# Patient Record
Sex: FEMALE | Race: WHITE | HISPANIC OR LATINO | Employment: FULL TIME | URBAN - METROPOLITAN AREA
[De-identification: names, ages, dates, MRNs, and addresses within clinical notes are randomized per-mention and may not be internally consistent; named-entity substitution may affect disease eponyms.]

---

## 2024-02-26 ENCOUNTER — LAB (OUTPATIENT)
Dept: LAB | Facility: LAB | Age: 39
End: 2024-02-26
Payer: COMMERCIAL

## 2024-02-26 ENCOUNTER — OFFICE VISIT (OUTPATIENT)
Dept: PRIMARY CARE | Facility: CLINIC | Age: 39
End: 2024-02-26
Payer: COMMERCIAL

## 2024-02-26 VITALS
BODY MASS INDEX: 47.09 KG/M2 | DIASTOLIC BLOOD PRESSURE: 94 MMHG | HEART RATE: 122 BPM | SYSTOLIC BLOOD PRESSURE: 142 MMHG | HEIGHT: 66 IN | OXYGEN SATURATION: 93 % | WEIGHT: 293 LBS

## 2024-02-26 DIAGNOSIS — R53.83 FATIGUE, UNSPECIFIED TYPE: Primary | ICD-10-CM

## 2024-02-26 DIAGNOSIS — G44.89 OTHER HEADACHE SYNDROME: ICD-10-CM

## 2024-02-26 DIAGNOSIS — D50.0 IRON DEFICIENCY ANEMIA DUE TO CHRONIC BLOOD LOSS: ICD-10-CM

## 2024-02-26 DIAGNOSIS — F41.1 GENERALIZED ANXIETY DISORDER: ICD-10-CM

## 2024-02-26 DIAGNOSIS — R53.83 FATIGUE, UNSPECIFIED TYPE: ICD-10-CM

## 2024-02-26 LAB
ALBUMIN SERPL BCP-MCNC: 4.3 G/DL (ref 3.4–5)
ALP SERPL-CCNC: 81 U/L (ref 33–110)
ALT SERPL W P-5'-P-CCNC: 21 U/L (ref 7–45)
ANION GAP SERPL CALC-SCNC: 11 MMOL/L (ref 10–20)
AST SERPL W P-5'-P-CCNC: 15 U/L (ref 9–39)
BILIRUB SERPL-MCNC: 0.3 MG/DL (ref 0–1.2)
BUN SERPL-MCNC: 9 MG/DL (ref 6–23)
CALCIUM SERPL-MCNC: 9.1 MG/DL (ref 8.6–10.3)
CHLORIDE SERPL-SCNC: 104 MMOL/L (ref 98–107)
CO2 SERPL-SCNC: 27 MMOL/L (ref 21–32)
CREAT SERPL-MCNC: 0.73 MG/DL (ref 0.5–1.05)
EGFRCR SERPLBLD CKD-EPI 2021: >90 ML/MIN/1.73M*2
ERYTHROCYTE [DISTWIDTH] IN BLOOD BY AUTOMATED COUNT: 14.5 % (ref 11.5–14.5)
FERRITIN SERPL-MCNC: 54 NG/ML (ref 8–150)
GLUCOSE SERPL-MCNC: 107 MG/DL (ref 74–99)
HCT VFR BLD AUTO: 40.8 % (ref 36–46)
HGB BLD-MCNC: 12.6 G/DL (ref 12–16)
IRON SATN MFR SERPL: 7 % (ref 25–45)
IRON SERPL-MCNC: 20 UG/DL (ref 35–150)
MAGNESIUM SERPL-MCNC: 1.95 MG/DL (ref 1.6–2.4)
MCH RBC QN AUTO: 25.1 PG (ref 26–34)
MCHC RBC AUTO-ENTMCNC: 30.9 G/DL (ref 32–36)
MCV RBC AUTO: 81 FL (ref 80–100)
NRBC BLD-RTO: 0 /100 WBCS (ref 0–0)
PLATELET # BLD AUTO: 289 X10*3/UL (ref 150–450)
POTASSIUM SERPL-SCNC: 4 MMOL/L (ref 3.5–5.3)
PROT SERPL-MCNC: 7 G/DL (ref 6.4–8.2)
RBC # BLD AUTO: 5.02 X10*6/UL (ref 4–5.2)
SODIUM SERPL-SCNC: 138 MMOL/L (ref 136–145)
TIBC SERPL-MCNC: 303 UG/DL (ref 240–445)
TSH SERPL-ACNC: 2.65 MIU/L (ref 0.44–3.98)
UIBC SERPL-MCNC: 283 UG/DL (ref 110–370)
VIT B12 SERPL-MCNC: 351 PG/ML (ref 211–911)
WBC # BLD AUTO: 11.3 X10*3/UL (ref 4.4–11.3)

## 2024-02-26 PROCEDURE — 82607 VITAMIN B-12: CPT

## 2024-02-26 PROCEDURE — 84443 ASSAY THYROID STIM HORMONE: CPT

## 2024-02-26 PROCEDURE — 83735 ASSAY OF MAGNESIUM: CPT

## 2024-02-26 PROCEDURE — 85027 COMPLETE CBC AUTOMATED: CPT

## 2024-02-26 PROCEDURE — 1036F TOBACCO NON-USER: CPT | Performed by: FAMILY MEDICINE

## 2024-02-26 PROCEDURE — 99214 OFFICE O/P EST MOD 30 MIN: CPT | Performed by: FAMILY MEDICINE

## 2024-02-26 PROCEDURE — 82728 ASSAY OF FERRITIN: CPT

## 2024-02-26 PROCEDURE — 80053 COMPREHEN METABOLIC PANEL: CPT

## 2024-02-26 PROCEDURE — 83550 IRON BINDING TEST: CPT

## 2024-02-26 PROCEDURE — 36415 COLL VENOUS BLD VENIPUNCTURE: CPT

## 2024-02-26 PROCEDURE — 83540 ASSAY OF IRON: CPT

## 2024-02-26 ASSESSMENT — ENCOUNTER SYMPTOMS
ABDOMINAL PAIN: 0
MYALGIAS: 0
ARTHRALGIAS: 1
PALPITATIONS: 0
LIGHT-HEADEDNESS: 0
COUGH: 1
SLEEP DISTURBANCE: 1
BLOOD IN STOOL: 0
NERVOUS/ANXIOUS: 1
DIARRHEA: 0
HEADACHES: 1
POLYDIPSIA: 0
SHORTNESS OF BREATH: 0
DIZZINESS: 1
BACK PAIN: 1
NAUSEA: 0
FREQUENCY: 0
CONSTIPATION: 0
DYSURIA: 0
FATIGUE: 1

## 2024-02-26 ASSESSMENT — PATIENT HEALTH QUESTIONNAIRE - PHQ9
SUM OF ALL RESPONSES TO PHQ9 QUESTIONS 1 AND 2: 0
2. FEELING DOWN, DEPRESSED OR HOPELESS: NOT AT ALL
1. LITTLE INTEREST OR PLEASURE IN DOING THINGS: NOT AT ALL

## 2024-02-26 NOTE — PROGRESS NOTES
"Subjective   Patient ID: Lois Jo is a 38 y.o. female who presents for Dizziness (x6mo).    Dizziness  Associated symptoms include arthralgias, coughing, fatigue and headaches. Pertinent negatives include no abdominal pain, chest pain, myalgias, nausea or rash.   She has been having some dizziness off and on for the last few months.  She is also been getting fairly regular tension-like headaches.  Excedrin helps.  But they are different from her migraine she has had before.  She also has a history of anemia from heavy periods but is on an IUD now.    Repeat check blood pressure was little bit higher than her first blood pressure.    At this point I think she has significant anxiety, but since her weight has gone up significantly last year I think that that is what is also driving the headaches and the dizziness.    Labs this week.  Likely start Lexapro and the blood pressure medication.  No office visit set yet.      Review of Systems   Constitutional:  Positive for fatigue.   HENT:  Negative for hearing loss.    Eyes:  Negative for visual disturbance.   Respiratory:  Positive for cough. Negative for shortness of breath.    Cardiovascular:  Negative for chest pain and palpitations.   Gastrointestinal:  Negative for abdominal pain, blood in stool, constipation, diarrhea and nausea.   Endocrine: Negative for cold intolerance, heat intolerance and polydipsia.   Genitourinary:  Negative for dysuria and frequency.   Musculoskeletal:  Positive for arthralgias and back pain. Negative for myalgias.   Skin:  Negative for rash.   Neurological:  Positive for dizziness and headaches. Negative for light-headedness.   Psychiatric/Behavioral:  Positive for sleep disturbance. The patient is nervous/anxious.        Objective   BP (!) 142/94   Pulse (!) 122   Ht 1.676 m (5' 6\")   Wt 143 kg (316 lb)   SpO2 93%   BMI 51.00 kg/m²     Physical Exam  Constitutional:       Appearance: Normal appearance.   HENT:      Head: " Normocephalic and atraumatic.      Right Ear: Tympanic membrane and ear canal normal.      Left Ear: Tympanic membrane and ear canal normal.      Mouth/Throat:      Mouth: Mucous membranes are moist.      Pharynx: No oropharyngeal exudate or posterior oropharyngeal erythema.   Cardiovascular:      Rate and Rhythm: Normal rate and regular rhythm.      Heart sounds: Normal heart sounds.   Pulmonary:      Effort: Pulmonary effort is normal.      Breath sounds: Normal breath sounds.   Skin:     General: Skin is warm and dry.   Neurological:      General: No focal deficit present.      Mental Status: She is alert and oriented to person, place, and time.   Psychiatric:         Mood and Affect: Mood is anxious.         Behavior: Behavior normal.         Thought Content: Thought content normal.         Judgment: Judgment normal.         Assessment/Plan   Problem List Items Addressed This Visit             ICD-10-CM    Other headache syndrome G44.89    Relevant Orders    CBC    Comprehensive Metabolic Panel    Magnesium    Vitamin B12    Thyroid Stimulating Hormone    Iron and TIBC    Ferritin    Iron deficiency anemia due to chronic blood loss D50.0    Relevant Orders    CBC    Comprehensive Metabolic Panel    Magnesium    Vitamin B12    Thyroid Stimulating Hormone    Iron and TIBC    Ferritin    Generalized anxiety disorder F41.1     Other Visit Diagnoses         Codes    Fatigue, unspecified type    -  Primary R53.83    Relevant Orders    CBC    Comprehensive Metabolic Panel    Magnesium    Vitamin B12    Thyroid Stimulating Hormone    Iron and TIBC    Ferritin

## 2024-02-27 ENCOUNTER — TELEPHONE (OUTPATIENT)
Dept: PRIMARY CARE | Facility: CLINIC | Age: 39
End: 2024-02-27
Payer: COMMERCIAL

## 2024-02-27 DIAGNOSIS — Z00.00 ROUTINE GENERAL MEDICAL EXAMINATION AT A HEALTH CARE FACILITY: Primary | ICD-10-CM

## 2024-02-27 RX ORDER — ESCITALOPRAM OXALATE 5 MG/1
5 TABLET ORAL DAILY
Qty: 30 TABLET | Refills: 1 | Status: SHIPPED | OUTPATIENT
Start: 2024-02-27 | End: 2024-04-05 | Stop reason: ALTCHOICE

## 2024-02-27 RX ORDER — AMLODIPINE BESYLATE 5 MG/1
5 TABLET ORAL DAILY
Qty: 30 TABLET | Refills: 1 | Status: SHIPPED | OUTPATIENT
Start: 2024-02-27 | End: 2024-04-05 | Stop reason: SDUPTHER

## 2024-02-27 NOTE — TELEPHONE ENCOUNTER
----- Message from Jayjay Fletcher MD sent at 2/27/2024 11:53 AM EST -----  Notify Basic labs are okay  New medicines done for mood and blood pressure  Needs an office visit in 6 weeks.

## 2024-02-27 NOTE — RESULT ENCOUNTER NOTE
Notify Basic labs are okay  New medicines done for mood and blood pressure  Needs an office visit in 6 weeks.

## 2024-04-05 ENCOUNTER — OFFICE VISIT (OUTPATIENT)
Dept: PRIMARY CARE | Facility: CLINIC | Age: 39
End: 2024-04-05
Payer: COMMERCIAL

## 2024-04-05 VITALS
WEIGHT: 293 LBS | HEART RATE: 127 BPM | HEIGHT: 66 IN | DIASTOLIC BLOOD PRESSURE: 84 MMHG | SYSTOLIC BLOOD PRESSURE: 118 MMHG | BODY MASS INDEX: 47.09 KG/M2 | OXYGEN SATURATION: 96 %

## 2024-04-05 DIAGNOSIS — F41.1 GENERALIZED ANXIETY DISORDER: ICD-10-CM

## 2024-04-05 DIAGNOSIS — I10 PRIMARY HYPERTENSION: Primary | ICD-10-CM

## 2024-04-05 DIAGNOSIS — R05.1 ACUTE COUGH: ICD-10-CM

## 2024-04-05 PROCEDURE — 99214 OFFICE O/P EST MOD 30 MIN: CPT | Performed by: FAMILY MEDICINE

## 2024-04-05 PROCEDURE — 3079F DIAST BP 80-89 MM HG: CPT | Performed by: FAMILY MEDICINE

## 2024-04-05 PROCEDURE — 3074F SYST BP LT 130 MM HG: CPT | Performed by: FAMILY MEDICINE

## 2024-04-05 PROCEDURE — 1036F TOBACCO NON-USER: CPT | Performed by: FAMILY MEDICINE

## 2024-04-05 RX ORDER — FLUOXETINE 10 MG/1
10 CAPSULE ORAL DAILY
Qty: 90 CAPSULE | Refills: 3 | Status: SHIPPED | OUTPATIENT
Start: 2024-04-05 | End: 2025-04-05

## 2024-04-05 RX ORDER — AMLODIPINE BESYLATE 5 MG/1
5 TABLET ORAL DAILY
Qty: 90 TABLET | Refills: 3 | Status: SHIPPED | OUTPATIENT
Start: 2024-04-05 | End: 2025-04-05

## 2024-04-05 ASSESSMENT — PATIENT HEALTH QUESTIONNAIRE - PHQ9
5. POOR APPETITE OR OVEREATING: NEARLY EVERY DAY
3. TROUBLE FALLING OR STAYING ASLEEP OR SLEEPING TOO MUCH: NEARLY EVERY DAY
SUM OF ALL RESPONSES TO PHQ9 QUESTIONS 1 AND 2: 6
9. THOUGHTS THAT YOU WOULD BE BETTER OFF DEAD, OR OF HURTING YOURSELF: NOT AT ALL
8. MOVING OR SPEAKING SO SLOWLY THAT OTHER PEOPLE COULD HAVE NOTICED. OR THE OPPOSITE, BEING SO FIGETY OR RESTLESS THAT YOU HAVE BEEN MOVING AROUND A LOT MORE THAN USUAL: NOT AT ALL
4. FEELING TIRED OR HAVING LITTLE ENERGY: NEARLY EVERY DAY
2. FEELING DOWN, DEPRESSED OR HOPELESS: NEARLY EVERY DAY
10. IF YOU CHECKED OFF ANY PROBLEMS, HOW DIFFICULT HAVE THESE PROBLEMS MADE IT FOR YOU TO DO YOUR WORK, TAKE CARE OF THINGS AT HOME, OR GET ALONG WITH OTHER PEOPLE: SOMEWHAT DIFFICULT
7. TROUBLE CONCENTRATING ON THINGS, SUCH AS READING THE NEWSPAPER OR WATCHING TELEVISION: NOT AT ALL
SUM OF ALL RESPONSES TO PHQ QUESTIONS 1-9: 18
6. FEELING BAD ABOUT YOURSELF - OR THAT YOU ARE A FAILURE OR HAVE LET YOURSELF OR YOUR FAMILY DOWN: NEARLY EVERY DAY
1. LITTLE INTEREST OR PLEASURE IN DOING THINGS: NEARLY EVERY DAY

## 2024-04-05 ASSESSMENT — ENCOUNTER SYMPTOMS
PALPITATIONS: 0
FATIGUE: 0
DIARRHEA: 0
NAUSEA: 0
SHORTNESS OF BREATH: 0
COUGH: 1
HEADACHES: 0

## 2024-04-05 NOTE — PROGRESS NOTES
"Subjective   Patient ID: Lois Jo is a 38 y.o. female who presents for Follow-up (Having mood swings with Lexapro).    HPI   The Lexapro helped with her anxiety, but the longer she stayed on the medicine that actually started to cause a little bit of depression down mood and zombielike feeling.  She has been using it every other day to every third day and it is controlling the anxiety but still too much of the down feeling on the medicine.    The dizziness is completely gone on the blood pressure medicine and her blood pressure is much better than before.    Continue amlodipine 5 mg daily  Stop Lexapro  Start fluoxetine 10 mg daily  Office visit 6 months    Sick with a little bit of cough for the last week.  She is slowly getting better with it.  Exam is okay pulse ox is okay.  No additional medications at this time as needed Tylenol or ibuprofen.    Review of Systems   Constitutional:  Negative for fatigue.   HENT:  Positive for congestion.    Respiratory:  Positive for cough. Negative for shortness of breath.    Cardiovascular:  Negative for chest pain and palpitations.   Gastrointestinal:  Negative for diarrhea and nausea.   Neurological:  Negative for headaches.       Objective   /84   Pulse (!) 127   Ht 1.676 m (5' 6\")   Wt 142 kg (313 lb)   SpO2 96%   BMI 50.52 kg/m²     Physical Exam  Constitutional:       Appearance: Normal appearance.   HENT:      Head: Normocephalic and atraumatic.   Cardiovascular:      Rate and Rhythm: Normal rate and regular rhythm.      Heart sounds: Normal heart sounds.   Pulmonary:      Effort: Pulmonary effort is normal.      Breath sounds: Normal breath sounds.   Skin:     General: Skin is warm and dry.   Neurological:      General: No focal deficit present.      Mental Status: She is alert and oriented to person, place, and time.   Psychiatric:         Mood and Affect: Mood normal.         Behavior: Behavior normal.         Thought Content: Thought content normal.    "      Judgment: Judgment normal.         Assessment/Plan   Problem List Items Addressed This Visit             ICD-10-CM    Generalized anxiety disorder F41.1    Relevant Medications    FLUoxetine (PROzac) 10 mg capsule    Primary hypertension - Primary I10    Relevant Medications    amLODIPine (Norvasc) 5 mg tablet     Other Visit Diagnoses         Codes    Acute cough     R05.1

## 2024-06-04 ENCOUNTER — OFFICE VISIT (OUTPATIENT)
Dept: PRIMARY CARE | Facility: CLINIC | Age: 39
End: 2024-06-04
Payer: COMMERCIAL

## 2024-06-04 VITALS
HEIGHT: 66 IN | OXYGEN SATURATION: 98 % | SYSTOLIC BLOOD PRESSURE: 122 MMHG | BODY MASS INDEX: 47.09 KG/M2 | HEART RATE: 94 BPM | WEIGHT: 293 LBS | DIASTOLIC BLOOD PRESSURE: 84 MMHG

## 2024-06-04 DIAGNOSIS — K42.9 UMBILICAL HERNIA WITHOUT OBSTRUCTION AND WITHOUT GANGRENE: ICD-10-CM

## 2024-06-04 DIAGNOSIS — R05.1 ACUTE COUGH: ICD-10-CM

## 2024-06-04 DIAGNOSIS — R09.82 POSTNASAL DRIP: ICD-10-CM

## 2024-06-04 DIAGNOSIS — R35.0 URINARY FREQUENCY: Primary | ICD-10-CM

## 2024-06-04 DIAGNOSIS — J06.9 UPPER RESPIRATORY TRACT INFECTION, UNSPECIFIED TYPE: ICD-10-CM

## 2024-06-04 LAB
POC APPEARANCE, URINE: CLEAR
POC BILIRUBIN, URINE: ABNORMAL
POC BLOOD, URINE: ABNORMAL
POC COLOR, URINE: YELLOW
POC GLUCOSE, URINE: NEGATIVE MG/DL
POC KETONES, URINE: NEGATIVE MG/DL
POC LEUKOCYTES, URINE: ABNORMAL
POC NITRITE,URINE: NEGATIVE
POC PH, URINE: 5.5 PH
POC PROTEIN, URINE: ABNORMAL MG/DL
POC SPECIFIC GRAVITY, URINE: 1.02
POC UROBILINOGEN, URINE: 0.2 EU/DL

## 2024-06-04 PROCEDURE — 99214 OFFICE O/P EST MOD 30 MIN: CPT | Performed by: STUDENT IN AN ORGANIZED HEALTH CARE EDUCATION/TRAINING PROGRAM

## 2024-06-04 PROCEDURE — 3079F DIAST BP 80-89 MM HG: CPT | Performed by: STUDENT IN AN ORGANIZED HEALTH CARE EDUCATION/TRAINING PROGRAM

## 2024-06-04 PROCEDURE — 1036F TOBACCO NON-USER: CPT | Performed by: STUDENT IN AN ORGANIZED HEALTH CARE EDUCATION/TRAINING PROGRAM

## 2024-06-04 PROCEDURE — 81003 URINALYSIS AUTO W/O SCOPE: CPT | Performed by: STUDENT IN AN ORGANIZED HEALTH CARE EDUCATION/TRAINING PROGRAM

## 2024-06-04 PROCEDURE — 3074F SYST BP LT 130 MM HG: CPT | Performed by: STUDENT IN AN ORGANIZED HEALTH CARE EDUCATION/TRAINING PROGRAM

## 2024-06-04 RX ORDER — AMOXICILLIN 875 MG/1
875 TABLET, FILM COATED ORAL 2 TIMES DAILY
Qty: 14 TABLET | Refills: 0 | Status: SHIPPED | OUTPATIENT
Start: 2024-06-04 | End: 2024-06-11 | Stop reason: ALTCHOICE

## 2024-06-04 RX ORDER — FLUTICASONE PROPIONATE 50 MCG
1 SPRAY, SUSPENSION (ML) NASAL DAILY
Qty: 16 G | Refills: 2 | Status: SHIPPED | OUTPATIENT
Start: 2024-06-04 | End: 2024-06-11 | Stop reason: ALTCHOICE

## 2024-06-04 RX ORDER — BENZONATATE 100 MG/1
100 CAPSULE ORAL 3 TIMES DAILY PRN
Qty: 42 CAPSULE | Refills: 0 | Status: SHIPPED | OUTPATIENT
Start: 2024-06-04 | End: 2024-06-11 | Stop reason: ALTCHOICE

## 2024-06-04 ASSESSMENT — PATIENT HEALTH QUESTIONNAIRE - PHQ9
SUM OF ALL RESPONSES TO PHQ9 QUESTIONS 1 & 2: 0
2. FEELING DOWN, DEPRESSED OR HOPELESS: NOT AT ALL
1. LITTLE INTEREST OR PLEASURE IN DOING THINGS: NOT AT ALL

## 2024-06-04 NOTE — PROGRESS NOTES
Subjective:  Lois Jo is a 38 y.o. female who presents to clinic today for UTI SYMPTOMS  (FREQUENCY AND BURNING, FEELING LIKE NEED TO GO BUT JUST WENT, STARTED SUNDAY) and URI (X 10 DAYS COUGH, DRAINAGE)      She notes that she has had symptoms of URI since May 21. She had ear pain and coughing, runny nose. This past weekend so 2-4 days ago she has had a significant increase in coughing and sputum production. She is coughing so hard she is seeing stars. She is also gagging.  She initially had a sore throat which has fully cleared up.  2 days ago she felt that she needed to urinate and still felt like she needed to after going. No burning or frequency otherwise. She does have an IUD in place and is having her first period in 6 months. She feels like she can't empty her bladder fully.     She is not fevers, chills, vomiting. She had 1 day of diarrhea.     Review of Systems    Assessment/Plan:  Lois Jo is a 38 y.o. female with a history of obesity, umbilical hernia, anxiety who presents to clinic today to address the following issues:   1. Urinary frequency  POCT UA Automated manually resulted    amoxicillin (Amoxil) 875 mg tablet      2. Upper respiratory tract infection, unspecified type  amoxicillin (Amoxil) 875 mg tablet    fluticasone (Flonase) 50 mcg/actuation nasal spray      3. Postnasal drip  fluticasone (Flonase) 50 mcg/actuation nasal spray      4. Acute cough  benzonatate (Tessalon) 100 mg capsule      5. Umbilical hernia without obstruction and without gangrene  Referral to General Surgery        Discussed with Lois that her symptoms are likely related to post nasal drip but with the length of symptoms and possible early UTI it makes sense to start her on amoxicillin 875mg BID for 7 days. We discussed the risks/benefits and alternatives and also discussed supportive care. She will additionally use flonase, tessalon pearles (counseled on danger to children) and mucinex as needed.    She  "was also referred to general surgery to evaluate and possibly repair her large umbilical hernia.    Problem List Items Addressed This Visit    None  Visit Diagnoses       Urinary frequency    -  Primary    Relevant Medications    amoxicillin (Amoxil) 875 mg tablet    Other Relevant Orders    POCT UA Automated manually resulted (Completed)    Upper respiratory tract infection, unspecified type        Relevant Medications    amoxicillin (Amoxil) 875 mg tablet    fluticasone (Flonase) 50 mcg/actuation nasal spray    Postnasal drip        Relevant Medications    fluticasone (Flonase) 50 mcg/actuation nasal spray    Acute cough        Relevant Medications    benzonatate (Tessalon) 100 mg capsule    Umbilical hernia without obstruction and without gangrene        Relevant Orders    Referral to General Surgery            Patient Instructions   Sinuses:    Every day twice daily    1) 2 puffs Fluticasone in each nostril    After two weeks, reduce that Flonase to 2 puffs once daily     You can try mucinex    Amoxicillin 875mg twice daily  Honey as needed, best as a spoonful    Follow up: as needed    Return precautions discussed.  An After Visit Summary was given to the patient.  All questions were answered and patient in agreement with plan.    Objective:  /84   Pulse 94   Ht 1.676 m (5' 6\")   Wt 138 kg (304 lb 6.4 oz)   SpO2 98%   BMI 49.13 kg/m²     Physical Exam  Vitals and nursing note reviewed.   Constitutional:       General: She is not in acute distress.     Appearance: Normal appearance.   HENT:      Head: Normocephalic and atraumatic.      Right Ear: Tympanic membrane and ear canal normal.      Left Ear: Tympanic membrane and ear canal normal.      Nose: Congestion present.      Mouth/Throat:      Mouth: Mucous membranes are moist.      Pharynx: Posterior oropharyngeal erythema present.   Eyes:      General: No scleral icterus.        Right eye: No discharge.         Left eye: No discharge.      " Extraocular Movements: Extraocular movements intact.      Conjunctiva/sclera: Conjunctivae normal.   Cardiovascular:      Rate and Rhythm: Normal rate and regular rhythm.   Pulmonary:      Effort: Pulmonary effort is normal. No respiratory distress.      Breath sounds: Normal breath sounds.   Abdominal:      Tenderness: There is no right CVA tenderness, left CVA tenderness, guarding or rebound.      Comments: Large umbilical hernia   Skin:     General: Skin is warm and dry.   Neurological:      General: No focal deficit present.      Mental Status: She is alert and oriented to person, place, and time.   Psychiatric:         Attention and Perception: Attention normal.         Mood and Affect: Mood normal.         Speech: Speech normal.         Behavior: Behavior normal.         Cognition and Memory: Cognition and memory normal.         Judgment: Judgment normal.         I spent 18 minutes in total time for this visit including all related clinical activities before, during, and after the visit excluding other billable activities/procedure time.     Kiah Street MD

## 2024-06-04 NOTE — PATIENT INSTRUCTIONS
Sinuses:    Every day twice daily    1) 2 puffs Fluticasone in each nostril    After two weeks, reduce that Flonase to 2 puffs once daily     You can try mucinex    Amoxicillin 875mg twice daily  Honey as needed, best as a spoonful

## 2024-06-11 ENCOUNTER — OFFICE VISIT (OUTPATIENT)
Dept: SURGERY | Facility: CLINIC | Age: 39
End: 2024-06-11
Payer: COMMERCIAL

## 2024-06-11 VITALS
HEART RATE: 104 BPM | HEIGHT: 66 IN | DIASTOLIC BLOOD PRESSURE: 90 MMHG | WEIGHT: 293 LBS | BODY MASS INDEX: 47.09 KG/M2 | SYSTOLIC BLOOD PRESSURE: 142 MMHG

## 2024-06-11 DIAGNOSIS — K42.9 UMBILICAL HERNIA WITHOUT OBSTRUCTION AND WITHOUT GANGRENE: ICD-10-CM

## 2024-06-11 PROCEDURE — 3077F SYST BP >= 140 MM HG: CPT | Performed by: SURGERY

## 2024-06-11 PROCEDURE — 3080F DIAST BP >= 90 MM HG: CPT | Performed by: SURGERY

## 2024-06-11 PROCEDURE — 1036F TOBACCO NON-USER: CPT | Performed by: SURGERY

## 2024-06-11 PROCEDURE — 99213 OFFICE O/P EST LOW 20 MIN: CPT | Performed by: SURGERY

## 2024-06-11 NOTE — PROGRESS NOTES
General Surgery Consultation    Patient: Lois Jo  : 1985  MRN: [id number]  Date of Consultation: 24    Primary Care Provider: Jayjay Fletcher MD  Referring Provider: Kiah Street MD    Chief Complaint: Enlarging umbilical hernia    History of Present Illness: Lois Jo is a 38 y.o. old female seen at the request of Kiah Street MD for evaluation of an enlarging umbilical hernia.  The patient states that has been there since 2019.  It is never been incarcerated.  She is always able to reduce it.  She has gained weight since 2019 and her BMI is currently at 49.  She did share she has recently joined [employer] and has lost over 10 pounds in the last 6 weeks or so.  She is quite encouraged and likes the program..     Medical History:  Past Medical History:   Diagnosis Date    Anxiety     Encounter for full-term uncomplicated delivery ([facility])     Normal vaginal delivery    Headache     Hypertension     Iron deficiency anemia     Obesity, unspecified     Obesity with body mass index of 30.0-39.9    Other conditions influencing health status     Menstruation    Other specified postprocedural states     History of Papanicolaou smear    Presence of (intrauterine) contraceptive device     IUD (intrauterine device) in place       Surgical History:  Past Surgical History:   Procedure Laterality Date    OTHER SURGICAL HISTORY  2020    [place] tooth extraction    OTHER SURGICAL HISTORY  2014    Sigmoidoscopy flexible/ [place]       Home Medications:  Prior to Admission medications    Medication Sig Start Date End Date Taking? Authorizing Provider   amLODIPine (Norvasc) 5 mg tablet Take 1 tablet (5 mg) by mouth once daily. 24 Yes Jayjay Fletcher MD   FLUoxetine (PROzac) 10 mg capsule Take 1 capsule (10 mg) by mouth once daily. 24 Yes Jayjay Fletcher MD   amoxicillin (Amoxil) 875 mg tablet Take 1 tablet (875 mg) by mouth 2 times a day for 7 days. 24   "Kiah Street MD   benzonatate (Tessalon) 100 mg capsule Take 1 capsule (100 mg) by mouth 3 times a day as needed for cough. Do not crush or chew. 6/4/24 7/4/24  Kiah Street MD   fluticasone (Flonase) 50 mcg/actuation nasal spray Administer 1 spray into each nostril once daily. Shake gently. Before first use, prime pump. After use, clean tip and replace cap. 6/4/24 6/4/25  Kiah Street MD       Allergies:  No Known Allergies    Family History:   Family History   Problem Relation Name Age of Onset    Depression Mother      Irritable bowel syndrome Mother      Hypertension Mother      Alcohol abuse Father      Diabetes Father      Hypertension Father      Testicular cancer Brother      Hypertension Maternal Grandfather      Heart disease Maternal Grandfather      Cancer Other          GRANDMOTHER    Atrial fibrillation Other          GRANDFATHER       Social History:  She does not smoke she drinks socially and does not use drug      ROS:  Constitutional:  no fever, sweats, and chills  Cardiovascular: No chest pain  Respiratory: No cough or shortness of breath  Gastrointestinal: Denies  Genitourinary: no dysuria  Musculoskeletal: no weakness or swelling  Integumentary: no rashes  Neurological: no confusion  Endocrine: no heat or cold intolerance  Heme/Lymph: no easy bruising or bleeding    Objective:  /90   Pulse 104   Ht 1.676 m (5' 6\")   Wt 140 kg (309 lb 9.6 oz)   BMI 49.97 kg/m²     Physical Exam:  Constitutional: No acute distress, conversant, pleasant  Neurologic: alert and oriented  Psych: appropriate affect  Ears, Nose, Mouth and Throat: mucus membranes moist  Pulmonary: No labored breathing  Cardiovascular: Regular rate and rhythm  Abdomen: soft, non-distended, non-tender abdomen is globus and obese and she has a 3 cm fascial defect with reducible hernia above this.  It is nontender.  It is soft.  Musculoskeletal: Moves all extremities, no edema  Skin: warm and dry    Assessment and Plan: " Lois Jo is a 38 y.o. old female with an enlarging umbilical hernia.  We discussed the normal recommendations with hernia repair such as non-smoking and doing it in a nontension fashion.  I have recommended that she continue to work on weight loss.  We did discuss 50 if it acutely gets incarcerated and we cannot reduce it that we could fix this emergently but there is a higher risk of recurrence.  We will see her back in 2 months to reassess as she is strongly motivated to lose the weight and I imagine we will be able to fix this in short order.  She is quite pleased..     Rajni Ferro MD  6/11/2024

## 2024-08-13 ENCOUNTER — APPOINTMENT (OUTPATIENT)
Dept: SURGERY | Facility: CLINIC | Age: 39
End: 2024-08-13
Payer: COMMERCIAL

## 2024-09-03 ENCOUNTER — HOSPITAL ENCOUNTER (OUTPATIENT)
Dept: RADIOLOGY | Facility: HOSPITAL | Age: 39
Discharge: HOME | End: 2024-09-03
Payer: COMMERCIAL

## 2024-09-03 ENCOUNTER — OFFICE VISIT (OUTPATIENT)
Dept: URGENT CARE | Facility: CLINIC | Age: 39
End: 2024-09-03
Payer: COMMERCIAL

## 2024-09-03 VITALS
RESPIRATION RATE: 16 BRPM | BODY MASS INDEX: 47.09 KG/M2 | SYSTOLIC BLOOD PRESSURE: 134 MMHG | WEIGHT: 293 LBS | DIASTOLIC BLOOD PRESSURE: 91 MMHG | OXYGEN SATURATION: 98 % | TEMPERATURE: 97.5 F | HEART RATE: 100 BPM | HEIGHT: 66 IN

## 2024-09-03 DIAGNOSIS — M25.532 WRIST PAIN, ACUTE, LEFT: ICD-10-CM

## 2024-09-03 DIAGNOSIS — M25.572 ACUTE LEFT ANKLE PAIN: ICD-10-CM

## 2024-09-03 DIAGNOSIS — M25.532 WRIST PAIN, ACUTE, LEFT: Primary | ICD-10-CM

## 2024-09-03 PROCEDURE — 73110 X-RAY EXAM OF WRIST: CPT | Mod: LEFT SIDE | Performed by: RADIOLOGY

## 2024-09-03 PROCEDURE — 73610 X-RAY EXAM OF ANKLE: CPT | Mod: LT

## 2024-09-03 PROCEDURE — 73610 X-RAY EXAM OF ANKLE: CPT | Mod: LEFT SIDE | Performed by: RADIOLOGY

## 2024-09-03 PROCEDURE — 73110 X-RAY EXAM OF WRIST: CPT | Mod: LT

## 2024-09-03 PROCEDURE — 99212 OFFICE O/P EST SF 10 MIN: CPT | Performed by: PHYSICIAN ASSISTANT

## 2024-09-03 NOTE — PROGRESS NOTES
[facility] URGENT CARE [facility] NOTE:      Name: Lois Jo, 39 y.o.    CSN:[id number]   MRN:[id number]    PCP: TASNEEM Marroquin-CNP    ALL:  No Known Allergies    History:    Chief Complaint: Ankle Pain (Left ankle pain and left wrist pain x 3 days/Right knee looked at to make sure its heeling )    Encounter Date: 9/3/2024  10:10AM    HPI: The history was obtained from the patient. Lois is a 39 y.o. female, who presents with a chief complaint of Ankle Pain (Left ankle pain and left wrist pain x 3 days/Right knee looked at to make sure its heeling ) Patient states she she tripped on an electric cord cover at the country [facility], in which her ankle became twisted and she fell on her left hand and right knee.     Patient reports no head injury, LOC, or injury to other areas. Patient currently cannot bear weight on the left ankle and needs assistance when walking. Patient has a history of multiple injuries to the left ankle and a reported fracture (site unknown).     PMHx:    Past Medical History:   Diagnosis Date    Anxiety     Encounter for full-term uncomplicated delivery ([facility])     Normal vaginal delivery    Headache     Hypertension     Iron deficiency anemia     Obesity, unspecified     Obesity with body mass index of 30.0-39.9    Other conditions influencing health status     Menstruation    Other specified postprocedural states     History of Papanicolaou smear    Presence of (intrauterine) contraceptive device     IUD (intrauterine device) in place              Current Outpatient Medications   Medication Sig Dispense Refill    amLODIPine (Norvasc) 5 mg tablet Take 1 tablet (5 mg) by mouth once daily. 90 tablet 3    FLUoxetine (PROzac) 10 mg capsule Take 1 capsule (10 mg) by mouth once daily. 90 capsule 3     No current facility-administered medications for this visit.         PMSx:    Past Surgical History:   Procedure Laterality Date    OTHER SURGICAL HISTORY  09/24/2020    [place] tooth extraction     OTHER SURGICAL HISTORY  2014    Sigmoidoscopy flexible/ [facility] Hx:   Family History   Problem Relation Name Age of Onset    Depression Mother      Irritable bowel syndrome Mother      Hypertension Mother      Alcohol abuse Father      Diabetes Father      Hypertension Father      Testicular cancer Brother      Hypertension Maternal Grandfather      Heart disease Maternal Grandfather      Cancer Other          GRANDMOTHER    Atrial fibrillation Other          GRANDFATHER       SOC. Hx:     Social History     Socioeconomic History    Marital status:      Spouse name: Charles    Number of children: Not on file    Years of education: Not on file    Highest education level: Not on file   Occupational History    Not on file   Tobacco Use    Smoking status: Never     Passive exposure: Never    Smokeless tobacco: Never   Vaping Use    Vaping status: Never Used   Substance and Sexual Activity    Alcohol use: Yes    Drug use: Never    Sexual activity: Defer   Other Topics Concern    Not on file   Social History Narrative    Not on file     Social Determinants of Health     Financial Resource Strain: Not on file   Food Insecurity: Not on file   Transportation Needs: Not on file   Physical Activity: Not on file   Stress: Not on file   Social Connections: Not on file   Intimate Partner Violence: Not on file   Housing Stability: Not on file         Vitals:    09/03/24 0949   BP: (!) 134/91   Pulse: 100   Resp: 16   Temp: 36.4 °C (97.5 °F)   SpO2: 98%     140 kg (308 lb)          Physical Exam  Vitals reviewed.   Constitutional:       Appearance: Normal appearance. She is obese.      Comments: Female accompanied by another female visitor she is sitting in wheelchair.   HENT:      Head: Normocephalic and atraumatic.      Mouth/Throat:      Mouth: Mucous membranes are moist.   Cardiovascular:      Rate and Rhythm: Normal rate.   Pulmonary:      Effort: Pulmonary effort is normal.   Musculoskeletal:          General: Swelling present.      Left wrist: Swelling and snuff box tenderness present. Decreased range of motion.      Cervical back: Normal range of motion.      Right ankle: No swelling. No tenderness. Normal range of motion.      Left ankle: Swelling present. Tenderness present over the ATF ligament. Decreased range of motion.   Neurological:      General: No focal deficit present.      Mental Status: She is alert.             LABORATORY @ RADIOLOGICAL IMAGING (if done):   Both reviewed  XR Wrist:      FINDINGS:  No acute fracture or dislocation. Diffuse soft tissue swelling.  Chronic avulsion fracture of the medial malleolus tip. Plantar  calcaneal enthesophyte.      IMPRESSION:  Soft tissue swelling without acute osseous abnormality.      Signed by: Tiffany Jones 9/3/2024 12:36 PM  Dictation workstation:   [id number]    XR ANKLE  FINDINGS:  No acute fracture or dislocation. No significant soft tissue  swelling. Minus ulnar variance.      IMPRESSION:  No acute osseous abnormality.    ____________________________________________________________________    I did personally review Lois's past medical history, surgical history, social history, as well as family history (when relevant).  In this case, I also oversaw the her drug management by reviewing her medication list, allergy list, as well as the medications that I prescribed during the UC course and/or recommended as an out-patient (including possible OTC medications such as acetaminophen, NSAIDs , etc).    After reviewing the items above, I did look at previous medical documentation, such as recent hospitalizations, office visits, and/or recent consultations with PCP/specialist.                          SDOH:   Another factor that I considered in Lois's care was her Social Determinants of Health (SDOH). During this UC encounter, she did not have social determinants of health. Those SDOH influencing Lois's care are: none      UC COURSE/MEDICAL DECISION  MAKING:    Lois is a 39 y.o., who presents with a working diagnosis of   1. Wrist pain, acute, left    2. Acute left ankle pain     with a differential to include:   Sprain, strain, contusion, fracture, avulsion fracture, dislocation, tendinitis, tenosynovitis      Plan:   Patient was placed in a thumb spica as well as a cam boot, we discussed treatment plan to include use of over-the-counter anti-inflammatories x-rays were reviewed with her over the phone she may need another x-ray in another week or so recommend physical therapy referral for the ankle    IIrving,, PA student, helped prepare the medical record for my supervising clinician, Jayjay Erickson PA-C.   EMANUEL Barrios, [facility]    Supervised by  Jayjay Erickson PA-C   Advanced Practice Provider  [facility] URGENT CARE    I was present with the MIQUEL noble who participated in the documentation of this note. I have personally seen and re-examined the patient and performed the medical decision-making components (assessment and plan of care). I have reviewed the PA student documentation and verified the findings in the note as written with additions or exceptions as stated in the body of this note.    Jayjay Erickson PA-C

## 2024-09-03 NOTE — PROGRESS NOTES
"[facility] URGENT CARE   SALLY NOTE:      Name: Lois Jo, 39 y.o.    CSN:[id number]   MRN:[id number]    PCP: TASNEEM Marroquin-CNP    ALL:  No Known Allergies    History:    Chief Complaint: Ankle Pain (Left ankle pain and left wrist pain x 3 days/Right knee looked at to make sure its heeling )    Encounter Date: 9/3/2024  ***    HPI: The history was obtained from the {[facility] HISTORIAN:50194::\"patient\"}. Lois is a 39 y.o. female, who presents with a chief complaint of Ankle Pain (Left ankle pain and left wrist pain x 3 days/Right knee looked at to make sure its heeling ) ***    PMHx:    Past Medical History:   Diagnosis Date    Anxiety     Encounter for full-term uncomplicated delivery ([facility])     Normal vaginal delivery    Headache     Hypertension     Iron deficiency anemia     Obesity, unspecified     Obesity with body mass index of 30.0-39.9    Other conditions influencing health status     Menstruation    Other specified postprocedural states     History of Papanicolaou smear    Presence of (intrauterine) contraceptive device     IUD (intrauterine device) in place              Current Outpatient Medications   Medication Sig Dispense Refill    amLODIPine (Norvasc) 5 mg tablet Take 1 tablet (5 mg) by mouth once daily. 90 tablet 3    FLUoxetine (PROzac) 10 mg capsule Take 1 capsule (10 mg) by mouth once daily. 90 capsule 3     No current facility-administered medications for this visit.         PMSx:    Past Surgical History:   Procedure Laterality Date    OTHER SURGICAL HISTORY  09/24/2020    [place] tooth extraction    OTHER SURGICAL HISTORY  2014    Sigmoidoscopy flexible/ [facility] Hx:   Family History   Problem Relation Name Age of Onset    Depression Mother      Irritable bowel syndrome Mother      Hypertension Mother      Alcohol abuse Father      Diabetes Father      Hypertension Father      Testicular cancer Brother      Hypertension Maternal Grandfather      Heart disease " Maternal Grandfather      Cancer Other          GRANDMOTHER    Atrial fibrillation Other          GRANDFATHER       SOC. Hx:     Social History     Socioeconomic History    Marital status:      Spouse name: Charles    Number of children: Not on file    Years of education: Not on file    Highest education level: Not on file   Occupational History    Not on file   Tobacco Use    Smoking status: Never     Passive exposure: Never    Smokeless tobacco: Never   Vaping Use    Vaping status: Never Used   Substance and Sexual Activity    Alcohol use: Yes    Drug use: Never    Sexual activity: Defer   Other Topics Concern    Not on file   Social History Narrative    Not on file     Social Determinants of Health     Financial Resource Strain: Not on file   Food Insecurity: Not on file   Transportation Needs: Not on file   Physical Activity: Not on file   Stress: Not on file   Social Connections: Not on file   Intimate Partner Violence: Not on file   Housing Stability: Not on file         Vitals:    09/03/24 0949   BP: (!) 134/91   Pulse: 100   Resp: 16   Temp: 36.4 °C (97.5 °F)   SpO2: 98%     140 kg (308 lb)          Physical Exam  ***    LABORATORY @ RADIOLOGICAL IMAGING (if done):     No results found for this or any previous visit (from the past 24 hour(s)).    ____________________________________________________________________    I did personally review Lois's past medical history, surgical history, social history, as well as family history (when relevant).  In this case, I also oversaw the her drug management by reviewing her medication list, allergy list, as well as the medications that I prescribed during the UC course and/or recommended as an out-patient (including possible OTC medications such as acetaminophen, NSAIDs , etc).    After reviewing the items above, I {DID/DID NOT:03251} look at previous medical documentation, such as recent hospitalizations, office visits, and/or recent consultations with  PCP/specialist.                          SDOH:   Another factor that I considered in Lois's care was her Social Determinants of Health (SDOH). During this UC encounter, she {DID/DID NOT:01580} have social determinants of health. Those SDOH influencing Lois's care are: {pkvsdo:88431}      _____________________________________________________________________       COURSE/MEDICAL DECISION MAKING:    Lois is a 39 y.o., who presents with a working diagnosis of No diagnosis found. with a differential to include:         Jayjay Erickson PA-C   Advanced Practice Provider  [facility] URGENT CARE

## 2024-10-07 ENCOUNTER — APPOINTMENT (OUTPATIENT)
Dept: PRIMARY CARE | Facility: CLINIC | Age: 39
End: 2024-10-07
Payer: COMMERCIAL

## 2024-10-07 VITALS
WEIGHT: 293 LBS | HEIGHT: 66 IN | OXYGEN SATURATION: 95 % | HEART RATE: 107 BPM | BODY MASS INDEX: 47.09 KG/M2 | DIASTOLIC BLOOD PRESSURE: 90 MMHG | SYSTOLIC BLOOD PRESSURE: 130 MMHG

## 2024-10-07 DIAGNOSIS — M79.642 LEFT HAND PAIN: ICD-10-CM

## 2024-10-07 DIAGNOSIS — F41.1 GENERALIZED ANXIETY DISORDER: ICD-10-CM

## 2024-10-07 DIAGNOSIS — I10 PRIMARY HYPERTENSION: Primary | ICD-10-CM

## 2024-10-07 PROCEDURE — 3008F BODY MASS INDEX DOCD: CPT | Performed by: FAMILY MEDICINE

## 2024-10-07 PROCEDURE — 3080F DIAST BP >= 90 MM HG: CPT | Performed by: FAMILY MEDICINE

## 2024-10-07 PROCEDURE — 1036F TOBACCO NON-USER: CPT | Performed by: FAMILY MEDICINE

## 2024-10-07 PROCEDURE — 99214 OFFICE O/P EST MOD 30 MIN: CPT | Performed by: FAMILY MEDICINE

## 2024-10-07 PROCEDURE — 3075F SYST BP GE 130 - 139MM HG: CPT | Performed by: FAMILY MEDICINE

## 2024-10-07 RX ORDER — AMLODIPINE BESYLATE 10 MG/1
10 TABLET ORAL DAILY
Qty: 90 TABLET | Refills: 3 | Status: SHIPPED | OUTPATIENT
Start: 2024-10-07 | End: 2025-10-07

## 2024-10-07 ASSESSMENT — ENCOUNTER SYMPTOMS
PALPITATIONS: 0
FATIGUE: 0
ARTHRALGIAS: 1
CONSTIPATION: 0
COUGH: 0
HEADACHES: 0
DIARRHEA: 0
SHORTNESS OF BREATH: 0
CHEST TIGHTNESS: 0
ABDOMINAL PAIN: 0

## 2024-10-07 ASSESSMENT — PATIENT HEALTH QUESTIONNAIRE - PHQ9
2. FEELING DOWN, DEPRESSED OR HOPELESS: NOT AT ALL
SUM OF ALL RESPONSES TO PHQ9 QUESTIONS 1 & 2: 0
1. LITTLE INTEREST OR PLEASURE IN DOING THINGS: NOT AT ALL

## 2024-10-07 NOTE — PROGRESS NOTES
"Subjective   Patient ID: Lois Jo is a 39 y.o. female who presents for Follow-up (6 months HTN, MALIK).    HPI   She does get a little bit of dizziness/lightheadedness when her blood pressure goes up.  Had been better when we started the amlodipine.  Her weight went down for the summer but is up a little bit again.  Dizziness is back.   About trying a water pill etc., will increase amlodipine to 10 mg daily.  Loxitane did well without any side effects anxiety is great continue the same 10 mg dose.    After falling her ankle pain is getting a little bit better but her left wrist pain is not.  Repeat x-rays of the left hand and wrist.    Office visit 6 months    Review of Systems   Constitutional:  Negative for fatigue.   Eyes:  Negative for visual disturbance.   Respiratory:  Negative for cough, chest tightness and shortness of breath.    Cardiovascular:  Negative for chest pain and palpitations.   Gastrointestinal:  Negative for abdominal pain, constipation and diarrhea.   Musculoskeletal:  Positive for arthralgias.   Skin:  Negative for rash.   Neurological:  Negative for headaches.       Objective   /90   Pulse 107   Ht 1.676 m (5' 6\")   Wt 144 kg (317 lb 6.4 oz)   SpO2 95%   BMI 51.23 kg/m²     Physical Exam  Constitutional:       Appearance: Normal appearance.   HENT:      Head: Normocephalic and atraumatic.   Cardiovascular:      Rate and Rhythm: Normal rate and regular rhythm.      Heart sounds: Normal heart sounds.   Pulmonary:      Effort: Pulmonary effort is normal.      Breath sounds: Normal breath sounds.   Skin:     General: Skin is warm and dry.   Neurological:      General: No focal deficit present.      Mental Status: She is alert and oriented to person, place, and time.   Psychiatric:         Mood and Affect: Mood normal.         Behavior: Behavior normal.         Thought Content: Thought content normal.         Judgment: Judgment normal.         Assessment/Plan   Problem List Items " Addressed This Visit             ICD-10-CM    Generalized anxiety disorder F41.1    Primary hypertension - Primary I10    Relevant Medications    amLODIPine (Norvasc) 10 mg tablet     Other Visit Diagnoses         Codes    Left hand pain     M79.642    Relevant Orders    XR wrist left 3+ views    XR hand left 3+ views

## 2025-04-07 ENCOUNTER — APPOINTMENT (OUTPATIENT)
Age: 40
End: 2025-04-07
Payer: COMMERCIAL

## 2025-04-07 VITALS
HEIGHT: 66 IN | OXYGEN SATURATION: 94 % | HEART RATE: 64 BPM | DIASTOLIC BLOOD PRESSURE: 68 MMHG | WEIGHT: 293 LBS | BODY MASS INDEX: 47.09 KG/M2 | SYSTOLIC BLOOD PRESSURE: 120 MMHG

## 2025-04-07 DIAGNOSIS — I10 PRIMARY HYPERTENSION: Primary | ICD-10-CM

## 2025-04-07 DIAGNOSIS — F41.1 GENERALIZED ANXIETY DISORDER: ICD-10-CM

## 2025-04-07 PROCEDURE — 1036F TOBACCO NON-USER: CPT | Performed by: FAMILY MEDICINE

## 2025-04-07 PROCEDURE — 3008F BODY MASS INDEX DOCD: CPT | Performed by: FAMILY MEDICINE

## 2025-04-07 PROCEDURE — 99214 OFFICE O/P EST MOD 30 MIN: CPT | Performed by: FAMILY MEDICINE

## 2025-04-07 PROCEDURE — 3078F DIAST BP <80 MM HG: CPT | Performed by: FAMILY MEDICINE

## 2025-04-07 PROCEDURE — 3074F SYST BP LT 130 MM HG: CPT | Performed by: FAMILY MEDICINE

## 2025-04-07 RX ORDER — FLUOXETINE 10 MG/1
10 CAPSULE ORAL DAILY
Qty: 90 CAPSULE | Refills: 3 | Status: SHIPPED | OUTPATIENT
Start: 2025-04-07 | End: 2026-04-07

## 2025-04-07 ASSESSMENT — PATIENT HEALTH QUESTIONNAIRE - PHQ9
2. FEELING DOWN, DEPRESSED OR HOPELESS: NOT AT ALL
SUM OF ALL RESPONSES TO PHQ9 QUESTIONS 1 AND 2: 0
1. LITTLE INTEREST OR PLEASURE IN DOING THINGS: NOT AT ALL

## 2025-04-07 ASSESSMENT — ENCOUNTER SYMPTOMS
NERVOUS/ANXIOUS: 0
SHORTNESS OF BREATH: 0
COUGH: 0
SLEEP DISTURBANCE: 0
NAUSEA: 0
ARTHRALGIAS: 1
FATIGUE: 0

## 2025-04-10 DIAGNOSIS — R05.1 ACUTE COUGH: Primary | ICD-10-CM

## 2025-04-10 RX ORDER — PREDNISONE 20 MG/1
40 TABLET ORAL DAILY
Qty: 10 TABLET | Refills: 0 | Status: SHIPPED | OUTPATIENT
Start: 2025-04-10 | End: 2025-04-15

## 2025-04-23 ENCOUNTER — HOSPITAL ENCOUNTER (EMERGENCY)
Facility: HOSPITAL | Age: 40
Discharge: HOME | End: 2025-04-24
Attending: EMERGENCY MEDICINE
Payer: COMMERCIAL

## 2025-04-23 DIAGNOSIS — K42.9 UMBILICAL HERNIA WITHOUT OBSTRUCTION AND WITHOUT GANGRENE: ICD-10-CM

## 2025-04-23 DIAGNOSIS — R16.1 SPLENOMEGALY: Primary | ICD-10-CM

## 2025-04-23 DIAGNOSIS — R10.11 RIGHT UPPER QUADRANT ABDOMINAL PAIN: ICD-10-CM

## 2025-04-23 LAB
ALBUMIN SERPL BCP-MCNC: 3.9 G/DL (ref 3.4–5)
ALP SERPL-CCNC: 72 U/L (ref 33–110)
ALT SERPL W P-5'-P-CCNC: 15 U/L (ref 7–45)
ANION GAP SERPL CALC-SCNC: 10 MMOL/L (ref 10–20)
AST SERPL W P-5'-P-CCNC: 9 U/L (ref 9–39)
BASOPHILS # BLD AUTO: 0.03 X10*3/UL (ref 0–0.1)
BASOPHILS NFR BLD AUTO: 0.2 %
BILIRUB SERPL-MCNC: 0.3 MG/DL (ref 0–1.2)
BUN SERPL-MCNC: 12 MG/DL (ref 6–23)
CALCIUM SERPL-MCNC: 9.2 MG/DL (ref 8.6–10.3)
CHLORIDE SERPL-SCNC: 106 MMOL/L (ref 98–107)
CO2 SERPL-SCNC: 27 MMOL/L (ref 21–32)
CREAT SERPL-MCNC: 0.8 MG/DL (ref 0.5–1.05)
EGFRCR SERPLBLD CKD-EPI 2021: >90 ML/MIN/1.73M*2
EOSINOPHIL # BLD AUTO: 0.07 X10*3/UL (ref 0–0.7)
EOSINOPHIL NFR BLD AUTO: 0.6 %
ERYTHROCYTE [DISTWIDTH] IN BLOOD BY AUTOMATED COUNT: 14.6 % (ref 11.5–14.5)
GLUCOSE SERPL-MCNC: 189 MG/DL (ref 74–99)
HCT VFR BLD AUTO: 40 % (ref 36–46)
HGB BLD-MCNC: 12.3 G/DL (ref 12–16)
IMM GRANULOCYTES # BLD AUTO: 0.05 X10*3/UL (ref 0–0.7)
IMM GRANULOCYTES NFR BLD AUTO: 0.4 % (ref 0–0.9)
LACTATE SERPL-SCNC: 2.2 MMOL/L (ref 0.4–2)
LIPASE SERPL-CCNC: 14 U/L (ref 9–82)
LYMPHOCYTES # BLD AUTO: 1.57 X10*3/UL (ref 1.2–4.8)
LYMPHOCYTES NFR BLD AUTO: 12.5 %
MAGNESIUM SERPL-MCNC: 1.88 MG/DL (ref 1.6–2.4)
MCH RBC QN AUTO: 25.9 PG (ref 26–34)
MCHC RBC AUTO-ENTMCNC: 30.8 G/DL (ref 32–36)
MCV RBC AUTO: 84 FL (ref 80–100)
MONOCYTES # BLD AUTO: 0.37 X10*3/UL (ref 0.1–1)
MONOCYTES NFR BLD AUTO: 2.9 %
NEUTROPHILS # BLD AUTO: 10.51 X10*3/UL (ref 1.2–7.7)
NEUTROPHILS NFR BLD AUTO: 83.4 %
NRBC BLD-RTO: 0 /100 WBCS (ref 0–0)
PLATELET # BLD AUTO: 249 X10*3/UL (ref 150–450)
POTASSIUM SERPL-SCNC: 3.8 MMOL/L (ref 3.5–5.3)
PROT SERPL-MCNC: 6.9 G/DL (ref 6.4–8.2)
RBC # BLD AUTO: 4.75 X10*6/UL (ref 4–5.2)
SODIUM SERPL-SCNC: 139 MMOL/L (ref 136–145)
WBC # BLD AUTO: 12.6 X10*3/UL (ref 4.4–11.3)

## 2025-04-23 PROCEDURE — 96374 THER/PROPH/DIAG INJ IV PUSH: CPT

## 2025-04-23 PROCEDURE — 36415 COLL VENOUS BLD VENIPUNCTURE: CPT | Performed by: EMERGENCY MEDICINE

## 2025-04-23 PROCEDURE — 83690 ASSAY OF LIPASE: CPT | Performed by: EMERGENCY MEDICINE

## 2025-04-23 PROCEDURE — 80053 COMPREHEN METABOLIC PANEL: CPT | Performed by: EMERGENCY MEDICINE

## 2025-04-23 PROCEDURE — 83605 ASSAY OF LACTIC ACID: CPT | Performed by: EMERGENCY MEDICINE

## 2025-04-23 PROCEDURE — 96375 TX/PRO/DX INJ NEW DRUG ADDON: CPT

## 2025-04-23 PROCEDURE — 99285 EMERGENCY DEPT VISIT HI MDM: CPT | Performed by: EMERGENCY MEDICINE

## 2025-04-23 PROCEDURE — 2500000004 HC RX 250 GENERAL PHARMACY W/ HCPCS (ALT 636 FOR OP/ED): Performed by: EMERGENCY MEDICINE

## 2025-04-23 PROCEDURE — 83735 ASSAY OF MAGNESIUM: CPT | Performed by: EMERGENCY MEDICINE

## 2025-04-23 PROCEDURE — 96361 HYDRATE IV INFUSION ADD-ON: CPT

## 2025-04-23 PROCEDURE — 85025 COMPLETE CBC W/AUTO DIFF WBC: CPT | Performed by: EMERGENCY MEDICINE

## 2025-04-23 RX ORDER — SODIUM CHLORIDE 9 MG/ML
125 INJECTION, SOLUTION INTRAVENOUS CONTINUOUS
Status: DISCONTINUED | OUTPATIENT
Start: 2025-04-23 | End: 2025-04-24 | Stop reason: HOSPADM

## 2025-04-23 RX ORDER — ONDANSETRON HYDROCHLORIDE 2 MG/ML
4 INJECTION, SOLUTION INTRAVENOUS ONCE
Status: COMPLETED | OUTPATIENT
Start: 2025-04-23 | End: 2025-04-23

## 2025-04-23 RX ORDER — PANTOPRAZOLE SODIUM 40 MG/10ML
40 INJECTION, POWDER, LYOPHILIZED, FOR SOLUTION INTRAVENOUS ONCE
Status: COMPLETED | OUTPATIENT
Start: 2025-04-23 | End: 2025-04-23

## 2025-04-23 RX ORDER — SODIUM CHLORIDE 9 MG/ML
INJECTION, SOLUTION INTRAMUSCULAR; INTRAVENOUS; SUBCUTANEOUS
Status: DISCONTINUED
Start: 2025-04-23 | End: 2025-04-24 | Stop reason: HOSPADM

## 2025-04-23 RX ADMIN — SODIUM CHLORIDE 1000 ML: 0.9 INJECTION, SOLUTION INTRAVENOUS at 23:06

## 2025-04-23 RX ADMIN — ONDANSETRON 4 MG: 2 INJECTION, SOLUTION INTRAMUSCULAR; INTRAVENOUS at 23:08

## 2025-04-23 RX ADMIN — PANTOPRAZOLE SODIUM 40 MG: 40 INJECTION, POWDER, FOR SOLUTION INTRAVENOUS at 23:10

## 2025-04-23 ASSESSMENT — COLUMBIA-SUICIDE SEVERITY RATING SCALE - C-SSRS
1. IN THE PAST MONTH, HAVE YOU WISHED YOU WERE DEAD OR WISHED YOU COULD GO TO SLEEP AND NOT WAKE UP?: NO
2. HAVE YOU ACTUALLY HAD ANY THOUGHTS OF KILLING YOURSELF?: NO
6. HAVE YOU EVER DONE ANYTHING, STARTED TO DO ANYTHING, OR PREPARED TO DO ANYTHING TO END YOUR LIFE?: NO

## 2025-04-23 ASSESSMENT — PAIN DESCRIPTION - PAIN TYPE: TYPE: ACUTE PAIN

## 2025-04-23 ASSESSMENT — PAIN - FUNCTIONAL ASSESSMENT: PAIN_FUNCTIONAL_ASSESSMENT: 0-10

## 2025-04-23 ASSESSMENT — PAIN SCALES - GENERAL: PAINLEVEL_OUTOF10: 5 - MODERATE PAIN

## 2025-04-24 ENCOUNTER — APPOINTMENT (OUTPATIENT)
Dept: RADIOLOGY | Facility: HOSPITAL | Age: 40
End: 2025-04-24
Payer: COMMERCIAL

## 2025-04-24 ENCOUNTER — OFFICE VISIT (OUTPATIENT)
Age: 40
End: 2025-04-24
Payer: COMMERCIAL

## 2025-04-24 VITALS
HEART RATE: 94 BPM | TEMPERATURE: 98.4 F | BODY MASS INDEX: 47.09 KG/M2 | RESPIRATION RATE: 18 BRPM | OXYGEN SATURATION: 95 % | HEIGHT: 66 IN | WEIGHT: 293 LBS | SYSTOLIC BLOOD PRESSURE: 114 MMHG | DIASTOLIC BLOOD PRESSURE: 72 MMHG

## 2025-04-24 VITALS
DIASTOLIC BLOOD PRESSURE: 72 MMHG | OXYGEN SATURATION: 97 % | HEIGHT: 66 IN | BODY MASS INDEX: 47.09 KG/M2 | SYSTOLIC BLOOD PRESSURE: 138 MMHG | HEART RATE: 109 BPM | WEIGHT: 293 LBS

## 2025-04-24 DIAGNOSIS — R10.10 PAIN OF UPPER ABDOMEN: Primary | ICD-10-CM

## 2025-04-24 LAB
APPEARANCE UR: CLEAR
BILIRUB UR STRIP.AUTO-MCNC: NEGATIVE MG/DL
COLOR UR: ABNORMAL
GLUCOSE UR STRIP.AUTO-MCNC: NORMAL MG/DL
KETONES UR STRIP.AUTO-MCNC: NEGATIVE MG/DL
LACTATE SERPL-SCNC: 1.2 MMOL/L (ref 0.4–2)
LEUKOCYTE ESTERASE UR QL STRIP.AUTO: NEGATIVE
MUCOUS THREADS #/AREA URNS AUTO: NORMAL /LPF
NITRITE UR QL STRIP.AUTO: NEGATIVE
PH UR STRIP.AUTO: 7.5 [PH]
PROT UR STRIP.AUTO-MCNC: ABNORMAL MG/DL
RBC # UR STRIP.AUTO: NEGATIVE MG/DL
RBC #/AREA URNS AUTO: NORMAL /HPF
SP GR UR STRIP.AUTO: 1.04
SQUAMOUS #/AREA URNS AUTO: NORMAL /HPF
UROBILINOGEN UR STRIP.AUTO-MCNC: NORMAL MG/DL
WBC #/AREA URNS AUTO: NORMAL /HPF

## 2025-04-24 PROCEDURE — 3078F DIAST BP <80 MM HG: CPT | Performed by: FAMILY MEDICINE

## 2025-04-24 PROCEDURE — 36415 COLL VENOUS BLD VENIPUNCTURE: CPT | Performed by: EMERGENCY MEDICINE

## 2025-04-24 PROCEDURE — 2500000004 HC RX 250 GENERAL PHARMACY W/ HCPCS (ALT 636 FOR OP/ED): Mod: JZ | Performed by: EMERGENCY MEDICINE

## 2025-04-24 PROCEDURE — 3008F BODY MASS INDEX DOCD: CPT | Performed by: FAMILY MEDICINE

## 2025-04-24 PROCEDURE — 74177 CT ABD & PELVIS W/CONTRAST: CPT

## 2025-04-24 PROCEDURE — 99213 OFFICE O/P EST LOW 20 MIN: CPT | Performed by: FAMILY MEDICINE

## 2025-04-24 PROCEDURE — 1036F TOBACCO NON-USER: CPT | Performed by: FAMILY MEDICINE

## 2025-04-24 PROCEDURE — 2550000001 HC RX 255 CONTRASTS: Performed by: EMERGENCY MEDICINE

## 2025-04-24 PROCEDURE — 74177 CT ABD & PELVIS W/CONTRAST: CPT | Mod: FOREIGN READ | Performed by: RADIOLOGY

## 2025-04-24 PROCEDURE — 96361 HYDRATE IV INFUSION ADD-ON: CPT

## 2025-04-24 PROCEDURE — 83605 ASSAY OF LACTIC ACID: CPT | Performed by: EMERGENCY MEDICINE

## 2025-04-24 PROCEDURE — 81001 URINALYSIS AUTO W/SCOPE: CPT | Performed by: EMERGENCY MEDICINE

## 2025-04-24 PROCEDURE — 3075F SYST BP GE 130 - 139MM HG: CPT | Performed by: FAMILY MEDICINE

## 2025-04-24 RX ADMIN — IOHEXOL 70 ML: 350 INJECTION, SOLUTION INTRAVENOUS at 00:41

## 2025-04-24 RX ADMIN — SODIUM CHLORIDE 125 ML/HR: 0.9 INJECTION, SOLUTION INTRAVENOUS at 01:06

## 2025-04-24 ASSESSMENT — PATIENT HEALTH QUESTIONNAIRE - PHQ9
2. FEELING DOWN, DEPRESSED OR HOPELESS: NOT AT ALL
1. LITTLE INTEREST OR PLEASURE IN DOING THINGS: NOT AT ALL
SUM OF ALL RESPONSES TO PHQ9 QUESTIONS 1 AND 2: 0

## 2025-04-24 ASSESSMENT — PAIN SCALES - GENERAL
PAINLEVEL_OUTOF10: 3
PAINLEVEL_OUTOF10: 4

## 2025-04-24 ASSESSMENT — ENCOUNTER SYMPTOMS
DIARRHEA: 0
FEVER: 0
CHILLS: 0
NAUSEA: 1
CONSTIPATION: 1
VOMITING: 0
ABDOMINAL PAIN: 1

## 2025-04-24 NOTE — ED PROVIDER NOTES
HPI   Chief Complaint   Patient presents with    Abdominal Pain     Abd. Pain that began 4hr PTA, reports hx of umbilical hernia. +NV. No BM in >24hrs. Denies urinary issues       39-year-old female presents with chief complaint of midepigastric pain.  Patient states symptoms started about 3 to 4 hours ago.  Patient is complaining of some nausea and vomiting with symptoms.  Patient denies any fever, chills or diarrhea with presenting complaint.  I did speak to the patient about the laboratory studies and CT findings.  Patient has been instructed follow-up with her family medical doctor for an outpatient ultrasound of the gallbladder.        History provided by:  Patient          Patient History   Medical History[1]  Surgical History[2]  Family History[3]  Social History[4]    Physical Exam   ED Triage Vitals [04/23/25 2237]   Temperature Heart Rate Respirations BP   36.9 °C (98.4 °F) (!) 123 18 (!) 146/102      Pulse Ox Temp Source Heart Rate Source Patient Position   97 % Oral Monitor --      BP Location FiO2 (%)     -- --       Physical Exam  Vitals and nursing note reviewed.   Constitutional:       General: She is not in acute distress.     Appearance: She is well-developed. She is obese.   HENT:      Head: Normocephalic and atraumatic.   Eyes:      Conjunctiva/sclera: Conjunctivae normal.   Cardiovascular:      Rate and Rhythm: Normal rate and regular rhythm.      Heart sounds: No murmur heard.  Pulmonary:      Effort: Pulmonary effort is normal. No respiratory distress.      Breath sounds: Normal breath sounds.   Abdominal:      Palpations: Abdomen is soft.      Tenderness: There is abdominal tenderness in the right upper quadrant and epigastric area.      Comments:  Small Umbilical hernia   Musculoskeletal:         General: No swelling.      Cervical back: Neck supple.   Skin:     General: Skin is warm and dry.      Capillary Refill: Capillary refill takes less than 2 seconds.   Neurological:      Mental  Status: She is alert.   Psychiatric:         Mood and Affect: Mood normal.         Labs Reviewed   CBC WITH AUTO DIFFERENTIAL - Abnormal       Result Value    WBC 12.6 (*)     nRBC 0.0      RBC 4.75      Hemoglobin 12.3      Hematocrit 40.0      MCV 84      MCH 25.9 (*)     MCHC 30.8 (*)     RDW 14.6 (*)     Platelets 249      Neutrophils % 83.4      Immature Granulocytes %, Automated 0.4      Lymphocytes % 12.5      Monocytes % 2.9      Eosinophils % 0.6      Basophils % 0.2      Neutrophils Absolute 10.51 (*)     Immature Granulocytes Absolute, Automated 0.05      Lymphocytes Absolute 1.57      Monocytes Absolute 0.37      Eosinophils Absolute 0.07      Basophils Absolute 0.03     COMPREHENSIVE METABOLIC PANEL - Abnormal    Glucose 189 (*)     Sodium 139      Potassium 3.8      Chloride 106      Bicarbonate 27      Anion Gap 10      Urea Nitrogen 12      Creatinine 0.80      eGFR >90      Calcium 9.2      Albumin 3.9      Alkaline Phosphatase 72      Total Protein 6.9      AST 9      Bilirubin, Total 0.3      ALT 15     LACTATE - Abnormal    Lactate 2.2 (*)     Narrative:     Venipuncture immediately after or during the administration of Metamizole may lead to falsely low results. Testing should be performed immediately prior to Metamizole dosing.   URINALYSIS WITH REFLEX CULTURE AND MICROSCOPIC - Abnormal    Color, Urine Light-Yellow      Appearance, Urine Clear      Specific Gravity, Urine 1.037 (*)     pH, Urine 7.5      Protein, Urine 20 (TRACE)      Glucose, Urine Normal      Blood, Urine NEGATIVE      Ketones, Urine NEGATIVE      Bilirubin, Urine NEGATIVE      Urobilinogen, Urine Normal      Nitrite, Urine NEGATIVE      Leukocyte Esterase, Urine NEGATIVE     MAGNESIUM - Normal    Magnesium 1.88     LIPASE - Normal    Lipase 14      Narrative:     Venipuncture immediately after or during the administration of Metamizole may lead to falsely low results. Testing should be performed immediately prior to  Metamizole dosing.   LACTATE - Normal    Lactate 1.2      Narrative:     Venipuncture immediately after or during the administration of Metamizole may lead to falsely low results. Testing should be performed immediately prior to Metamizole dosing.   URINALYSIS WITH REFLEX CULTURE AND MICROSCOPIC    Narrative:     The following orders were created for panel order Urinalysis with Reflex Culture and Microscopic.  Procedure                               Abnormality         Status                     ---------                               -----------         ------                     Urinalysis with Reflex C...[[id number]]  Abnormal            Final result               Extra Urine Gray Tube[[id number]]                                                         Please view results for these tests on the individual orders.   EXTRA URINE GRAY TUBE   URINALYSIS MICROSCOPIC WITH REFLEX CULTURE    WBC, Urine 1-5      RBC, Urine 1-2      Squamous Epithelial Cells, Urine 1-9 (SPARSE)      Mucus, Urine FEW        CT abdomen pelvis w IV contrast   Final Result   1.Splenomegaly.   2.Midline abdominal wall hernia containing fat.   3.Distended gallbladder.   Signed by Albaro Puentes DO         ED Course & MDM   Diagnoses as of 04/24/25 0144   Splenomegaly   Umbilical hernia without obstruction and without gangrene   Right upper quadrant abdominal pain                 No data recorded     [place] Coma Scale Score: 15 (04/23/25 2237 : Lonny Barrientos RN)                           Medical Decision Making  1 bland diet 2 follow-up with family medical doctor for ultrasound of the gallbladder as an outpatient, if symptoms worsen return to ED.        Procedure  Procedures         [1]   Past Medical History:  Diagnosis Date    Anxiety     Encounter for full-term uncomplicated delivery     Normal vaginal delivery    Headache     Hypertension     Iron deficiency anemia     Obesity, unspecified     Obesity with body mass index of 30.0-39.9     Other conditions influencing health status     Menstruation    Other specified postprocedural states     History of Papanicolaou smear    Presence of (intrauterine) contraceptive device     IUD (intrauterine device) in place   [2]   Past Surgical History:  Procedure Laterality Date    OTHER SURGICAL HISTORY  09/24/2020    [place] tooth extraction    OTHER SURGICAL HISTORY  2014    Sigmoidoscopy flexible/ [place]   [3]   Family History  Problem Relation Name Age of Onset    Depression Mother      Irritable bowel syndrome Mother      Hypertension Mother      Alcohol abuse Father      Diabetes Father      Hypertension Father      Testicular cancer Brother      Hypertension Maternal Grandfather      Heart disease Maternal Grandfather      Cancer Other          GRANDMOTHER    Atrial fibrillation Other          GRANDFATHER   [4]   Social History  Tobacco Use    Smoking status: Never     Passive exposure: Never    Smokeless tobacco: Never   Vaping Use    Vaping status: Never Used   Substance Use Topics    Alcohol use: Yes    Drug use: Never        Ra Oliveira DO  04/24/25 0144

## 2025-04-24 NOTE — PROGRESS NOTES
"Subjective   Patient ID: Lois Jo is a 39 y.o. female who presents for ER Follow-up (Found umbilical- on ct with contrast).    HPI   Onset of significant upper abdominal pain about half hour eating a heavy meal.  It was lasagna with applesauce.  Pain resolved in the ER after she went  Ultrasound showed that there is some distention of the gallbladder.  Really she has had a little constipation over the week and a half.  That is getting a little bit better  Also she has significant ventral hernia that sometimes causes her pain.  But this pain was different than that typical pain    Suggest that she needs to eat smaller lighter more frequent meals.  Definitely avoid fats.  Make sure she stays well-hydrated  Ultrasound of the gallbladder  Needed she was following with Dr. Maldnoado for the hernia we can do further workup, but will start with the ultrasound.  Also have her start with a low amount of MiraLAX take every day and increase till she starts to have more regular bowel months over the Couple weeks.    If her pain gets really bad, she knows to go back to the ER.    Review of Systems   Constitutional:  Negative for chills and fever.   Gastrointestinal:  Positive for abdominal pain, constipation and nausea. Negative for diarrhea and vomiting.       Objective   /72   Pulse 109   Ht 1.676 m (5' 6\")   Wt 148 kg (326 lb)   SpO2 97%   BMI 52.62 kg/m²     Physical Exam  Constitutional:       Appearance: Normal appearance.   HENT:      Head: Normocephalic and atraumatic.   Skin:     General: Skin is warm and dry.   Neurological:      General: No focal deficit present.      Mental Status: She is alert and oriented to person, place, and time.   Psychiatric:         Mood and Affect: Mood normal.         Behavior: Behavior normal.         Thought Content: Thought content normal.         Judgment: Judgment normal.         Assessment/Plan   Problem List Items Addressed This Visit           ICD-10-CM    Pain of upper " abdomen - Primary R10.10    Relevant Orders    US gallbladder

## 2025-04-28 ENCOUNTER — HOSPITAL ENCOUNTER (OUTPATIENT)
Dept: RADIOLOGY | Facility: HOSPITAL | Age: 40
Discharge: HOME | End: 2025-04-28
Payer: COMMERCIAL

## 2025-04-28 DIAGNOSIS — R10.10 PAIN OF UPPER ABDOMEN: ICD-10-CM

## 2025-04-28 PROCEDURE — 76705 ECHO EXAM OF ABDOMEN: CPT | Performed by: RADIOLOGY

## 2025-04-28 PROCEDURE — 76705 ECHO EXAM OF ABDOMEN: CPT

## 2025-04-28 NOTE — RESULT ENCOUNTER NOTE
Notify ultrasound of the gallbladder pretty much shows the same results as the CAT scan.  If she still having troubles, then I would recommend referral to Dr. Maldonado.

## 2025-04-29 ENCOUNTER — TELEPHONE (OUTPATIENT)
Age: 40
End: 2025-04-29
Payer: COMMERCIAL

## 2025-04-29 DIAGNOSIS — R10.10 PAIN OF UPPER ABDOMEN: Primary | ICD-10-CM

## 2025-04-29 NOTE — TELEPHONE ENCOUNTER
----- Message from Jayjay Fletcher sent at 4/28/2025  4:05 PM EDT -----  Notify ultrasound of the gallbladder pretty much shows the same results as the CAT scan.  If she still having troubles, then I would recommend referral to Dr. Maldonado.  ----- Message -----  From: Interface, Radiology Results In  Sent: 4/28/2025   3:59 PM EDT  To: Jayjay Fletcher MD

## 2025-04-29 NOTE — TELEPHONE ENCOUNTER
Patient notified and understood, she said it would give her more peace of mind if she had the consult to Dr. Gardner

## 2025-05-06 ENCOUNTER — APPOINTMENT (OUTPATIENT)
Dept: SURGERY | Facility: CLINIC | Age: 40
End: 2025-05-06
Payer: COMMERCIAL

## 2025-05-06 VITALS
HEART RATE: 102 BPM | HEIGHT: 66 IN | WEIGHT: 293 LBS | DIASTOLIC BLOOD PRESSURE: 80 MMHG | SYSTOLIC BLOOD PRESSURE: 124 MMHG | BODY MASS INDEX: 47.09 KG/M2

## 2025-05-06 DIAGNOSIS — R10.10 PAIN OF UPPER ABDOMEN: ICD-10-CM

## 2025-05-06 PROCEDURE — 99214 OFFICE O/P EST MOD 30 MIN: CPT | Performed by: SURGERY

## 2025-05-06 PROCEDURE — 3008F BODY MASS INDEX DOCD: CPT | Performed by: SURGERY

## 2025-05-06 PROCEDURE — 3079F DIAST BP 80-89 MM HG: CPT | Performed by: SURGERY

## 2025-05-06 PROCEDURE — 3074F SYST BP LT 130 MM HG: CPT | Performed by: SURGERY

## 2025-05-06 RX ORDER — PANTOPRAZOLE SODIUM 40 MG/1
40 TABLET, DELAYED RELEASE ORAL
Qty: 30 TABLET | Refills: 1 | Status: SHIPPED | OUTPATIENT
Start: 2025-05-06 | End: 2025-07-05

## 2025-05-06 NOTE — PROGRESS NOTES
General Surgery Consultation    Patient: Lois Jo  : 1985  MRN: [id number]  Date of Consultation: 25    Primary Care Provider: Jayjay Fletcher MD  Referring Provider: Jayjay Fletcher MD    Chief Complaint: Recent ER visit with abdominal pain    History of Present Illness: oLis Jo is a 39 y.o. old female seen at the request of Jayjay Fletcher MD for evaluation of recent ER visit for abdominal pain.  The patient had postprandial fairly severe epigastric and right upper quadrant pain.  She has a known umbilical hernia which she has been trying to lose weight before we repair it.  CT scan of the abdomen and pelvis was done along with ultrasound of the right upper quadrant.  The hernia did look a little bit larger but there was no bowel present within it.  CT did not show any gallstones.  Ultrasound showed some minor thickening of the wall which could be chronic cholecystitis or a fasting state.  She does report over the last few months that she has had intermittent episodes with postprandial discomfort although none have been that severe.  She states she also wakes up in the middle of the night but is unsure if she is late on her hernia wrong.  She has been trying to lose some weight but she has had some personal challenges which have made her concentrating on the diet a second stage.  She is hoping to reengage..     Medical History:  Medical History[1]  Hypertension    Surgical History:  Surgical History[2]  No abdominal surgeries    Home Medications:  Prior to Admission medications    Medication Sig Start Date End Date Taking? Authorizing Provider   FLUoxetine (PROzac) 10 mg capsule Take 1 capsule (10 mg) by mouth once daily. 25 Yes Jayjay Fletcher MD   pantoprazole (Protonix) 40 mg EC tablet Take 1 tablet (40 mg) by mouth once daily in the morning. Take before meals. Do not crush, chew, or split. 25  Rajni Ferro MD       Allergies:  Allergies[3]  has no  "known allergies.    Social History:  Social History[4]  She does not smoke or use drugs.  She occasionally will have a social mix drink    ROS:  Constitutional:  no fever, sweats, and chills  Cardiovascular: No chest pain  Respiratory: No cough or shortness of breath  Gastrointestinal: Denies  Genitourinary: no dysuria  Musculoskeletal: no weakness or swelling  Integumentary: no rashes  Neurological: no confusion  Endocrine: no heat or cold intolerance  Heme/Lymph: no easy bruising or bleeding    Objective:  /80   Pulse 102   Ht 1.676 m (5' 6\")   Wt 148 kg (326 lb 6.4 oz)   BMI 52.68 kg/m²     Physical Exam:  Constitutional: No acute distress, conversant, pleasant  Neurologic: alert and oriented  Psych: appropriate affect  Ears, Nose, Mouth and Throat: mucus membranes moist  Pulmonary: No labored breathing  Cardiovascular: Regular rate and rhythm  Abdomen: soft, non-distended, non-tender obese.  She has a probably 5 cm easily reducible umbilical hernia.  There is no Gibson sign.  Musculoskeletal: Moves all extremities, no edema  Skin: warm and dry    Imaging:  I reviewed the films for the CT and the ultrasound.    Assessment and Plan: Lois Jo is a 39 y.o. old female with episodic epigastric and right upper quadrant pain, sometimes it is postprandial sometimes she wakes up with it.  We discussed the differential diagnosis being either gastric in origin or the gallbladder.  She did not have any stones on the ultrasound we discussed the fact they could just be very small or she could have biliary dyskinesia.  I sent her in a prescription for some Protonix and we will check an upper GI.  We will also do a CCK HIDA.  We will see her back afterward to discuss the results.  She understands that with biliary dyskinesia she can adjust her symptoms by strict dietary adherence.  But I told her if she does have [place] given [place].  The splenomegaly may just be an " incidentaloma.  She does not have portal hypertension..     Rajni Ferro MD  5/6/2025       [1]   Past Medical History:  Diagnosis Date    Anxiety     Encounter for full-term uncomplicated delivery     Normal vaginal delivery    Headache     Hypertension     Iron deficiency anemia     Obesity, unspecified     Obesity with body mass index of 30.0-39.9    Other conditions influencing health status     Menstruation    Other specified postprocedural states     History of Papanicolaou smear    Presence of (intrauterine) contraceptive device     IUD (intrauterine device) in place   [2]   Past Surgical History:  Procedure Laterality Date    OTHER SURGICAL HISTORY  09/24/2020    [place] tooth extraction    OTHER SURGICAL HISTORY  2014    Sigmoidoscopy flexible/ [place]   [3] No Known Allergies  [4]   Social History  Socioeconomic History    Marital status:      Spouse name: Charles   Tobacco Use    Smoking status: Never     Passive exposure: Never    Smokeless tobacco: Never   Vaping Use    Vaping status: Never Used   Substance and Sexual Activity    Alcohol use: Yes     Comment: socially drinks a mixed drink    Drug use: Never    Sexual activity: Defer

## 2025-05-09 ENCOUNTER — HOSPITAL ENCOUNTER (OUTPATIENT)
Dept: RADIOLOGY | Facility: HOSPITAL | Age: 40
Discharge: HOME | End: 2025-05-09
Payer: COMMERCIAL

## 2025-05-09 DIAGNOSIS — R10.10 PAIN OF UPPER ABDOMEN: ICD-10-CM

## 2025-05-09 PROCEDURE — 2500000005 HC RX 250 GENERAL PHARMACY W/O HCPCS: Performed by: SURGERY

## 2025-05-09 PROCEDURE — A9698 NON-RAD CONTRAST MATERIALNOC: HCPCS | Performed by: SURGERY

## 2025-05-09 PROCEDURE — 74240 X-RAY XM UPR GI TRC 1CNTRST: CPT

## 2025-05-09 RX ADMIN — BARIUM SULFATE 340 ML: 980 POWDER, FOR SUSPENSION ORAL at 10:24

## 2025-05-09 RX ADMIN — BARIUM SULFATE 176 ML: 960 POWDER, FOR SUSPENSION ORAL at 10:23

## 2025-05-12 ENCOUNTER — APPOINTMENT (OUTPATIENT)
Dept: RADIOLOGY | Facility: HOSPITAL | Age: 40
End: 2025-05-12
Payer: COMMERCIAL

## 2025-05-12 ENCOUNTER — HOSPITAL ENCOUNTER (OUTPATIENT)
Dept: RADIOLOGY | Facility: HOSPITAL | Age: 40
Discharge: HOME | End: 2025-05-12
Payer: COMMERCIAL

## 2025-05-12 DIAGNOSIS — R05.1 ACUTE COUGH: Primary | ICD-10-CM

## 2025-05-12 DIAGNOSIS — R10.10 PAIN OF UPPER ABDOMEN: ICD-10-CM

## 2025-05-12 PROCEDURE — A9537 TC99M MEBROFENIN: HCPCS | Performed by: SURGERY

## 2025-05-12 PROCEDURE — 78227 HEPATOBIL SYST IMAGE W/DRUG: CPT

## 2025-05-12 PROCEDURE — 2500000004 HC RX 250 GENERAL PHARMACY W/ HCPCS (ALT 636 FOR OP/ED): Mod: JZ | Performed by: SURGERY

## 2025-05-12 PROCEDURE — 78227 HEPATOBIL SYST IMAGE W/DRUG: CPT | Performed by: STUDENT IN AN ORGANIZED HEALTH CARE EDUCATION/TRAINING PROGRAM

## 2025-05-12 PROCEDURE — 3430000001 HC RX 343 DIAGNOSTIC RADIOPHARMACEUTICALS: Performed by: SURGERY

## 2025-05-12 RX ORDER — SINCALIDE 5 UG/5ML
2.96 INJECTION, POWDER, LYOPHILIZED, FOR SOLUTION INTRAVENOUS ONCE
Status: COMPLETED | OUTPATIENT
Start: 2025-05-12 | End: 2025-05-12

## 2025-05-12 RX ORDER — PREDNISONE 20 MG/1
40 TABLET ORAL DAILY
Qty: 10 TABLET | Refills: 0 | Status: SHIPPED | OUTPATIENT
Start: 2025-05-12 | End: 2025-05-17

## 2025-05-12 RX ORDER — KIT FOR THE PREPARATION OF TECHNETIUM TC 99M MEBROFENIN 45 MG/10ML
5 INJECTION, POWDER, LYOPHILIZED, FOR SOLUTION INTRAVENOUS
Status: COMPLETED | OUTPATIENT
Start: 2025-05-12 | End: 2025-05-12

## 2025-05-12 RX ADMIN — KIT FOR THE PREPARATION OF TECHNETIUM TC 99M MEBROFENIN 5 MILLICURIE: 45 INJECTION, POWDER, LYOPHILIZED, FOR SOLUTION INTRAVENOUS at 11:13

## 2025-05-12 RX ADMIN — SINCALIDE 2.96 MCG: 5 INJECTION, POWDER, LYOPHILIZED, FOR SOLUTION INTRAVENOUS at 12:27

## 2025-05-19 ENCOUNTER — APPOINTMENT (OUTPATIENT)
Dept: SURGERY | Facility: CLINIC | Age: 40
End: 2025-05-19
Payer: COMMERCIAL

## 2025-10-07 ENCOUNTER — APPOINTMENT (OUTPATIENT)
Age: 40
End: 2025-10-07
Payer: COMMERCIAL